# Patient Record
Sex: FEMALE | Race: WHITE | NOT HISPANIC OR LATINO | Employment: FULL TIME | URBAN - METROPOLITAN AREA
[De-identification: names, ages, dates, MRNs, and addresses within clinical notes are randomized per-mention and may not be internally consistent; named-entity substitution may affect disease eponyms.]

---

## 2022-10-17 ENCOUNTER — DOCUMENTATION (OUTPATIENT)
Dept: NEUROSURGERY | Facility: CLINIC | Age: 57
End: 2022-10-17

## 2022-10-17 ENCOUNTER — TELEPHONE (OUTPATIENT)
Dept: NEUROSURGERY | Facility: CLINIC | Age: 57
End: 2022-10-17

## 2022-10-17 NOTE — TELEPHONE ENCOUNTER
Called patient to remind her to bring he radiology disc to her appointment on 10/18. Patient does not have voicemail.

## 2022-10-17 NOTE — PROGRESS NOTES
Called patient to remind her to bring her Radiolog disc to her visit. Patients voicemail is not set up.

## 2022-10-18 ENCOUNTER — OFFICE VISIT (OUTPATIENT)
Dept: NEUROSURGERY | Facility: CLINIC | Age: 57
End: 2022-10-18

## 2022-10-18 VITALS
SYSTOLIC BLOOD PRESSURE: 150 MMHG | BODY MASS INDEX: 26.84 KG/M2 | DIASTOLIC BLOOD PRESSURE: 68 MMHG | WEIGHT: 171 LBS | HEART RATE: 89 BPM | HEIGHT: 67 IN

## 2022-10-18 DIAGNOSIS — S22.050A COMPRESSION FRACTURE OF T6 VERTEBRA, INITIAL ENCOUNTER: Primary | ICD-10-CM

## 2022-10-18 DIAGNOSIS — M47.814 THORACIC SPONDYLOSIS WITHOUT MYELOPATHY: ICD-10-CM

## 2022-10-18 DIAGNOSIS — M54.9 MID BACK PAIN: ICD-10-CM

## 2022-10-18 PROCEDURE — 99203 OFFICE O/P NEW LOW 30 MIN: CPT | Performed by: NURSE PRACTITIONER

## 2022-10-18 RX ORDER — HYDROCODONE BITARTRATE AND ACETAMINOPHEN 5; 325 MG/1; MG/1
1 TABLET ORAL EVERY 6 HOURS PRN
COMMUNITY
Start: 2022-10-06 | End: 2022-11-29

## 2022-10-18 RX ORDER — LISINOPRIL 10 MG/1
10 TABLET ORAL DAILY
COMMUNITY
Start: 2022-10-03

## 2022-10-18 RX ORDER — IBUPROFEN 800 MG/1
TABLET ORAL
COMMUNITY
Start: 2022-10-08

## 2022-10-18 RX ORDER — GRAPE SEED EXT/BIOFLAV,CITRUS 50MG-250MG
CAPSULE ORAL
COMMUNITY

## 2022-10-18 RX ORDER — ALENDRONATE SODIUM 10 MG/1
TABLET ORAL
COMMUNITY
Start: 2022-09-24

## 2022-10-18 RX ORDER — ACETAMINOPHEN 160 MG
2000 TABLET,DISINTEGRATING ORAL DAILY
COMMUNITY
Start: 2022-10-06

## 2022-10-18 RX ORDER — ESCITALOPRAM OXALATE 20 MG/1
20 TABLET ORAL DAILY
COMMUNITY
Start: 2022-09-24

## 2022-10-18 RX ORDER — PANTOPRAZOLE SODIUM 20 MG/1
20 TABLET, DELAYED RELEASE ORAL DAILY
COMMUNITY
Start: 2022-07-29

## 2022-10-18 RX ORDER — HYDROCHLOROTHIAZIDE 25 MG/1
25 TABLET ORAL EVERY MORNING
COMMUNITY
Start: 2022-09-23

## 2022-10-18 NOTE — PROGRESS NOTES
"Chief Complaint  Back Pain (Mid back to left side)    Subjective          Karlie VIZCAINO who is a 57 y.o. year old female who presents to [facility] for evaluation of mid back pain.    The patient complains of pain located in the thoracic  spine.  Patients states the pain has been present for 1 months.  The pain came on acutely.  She works as a  and . She reports taht she was doing some lifting, frying french fries, when she felt pain in her mid back. She woke up the next day and pain had worsened, continued to progress to the point she had to go home. The pain scale level is 3 at rest. Will increase to an 8/10 with activities.  The pain does radiate. Dermatomes are located on left Thoracic at: T6, T7 and T8..  The pain is waxing/waning and described as burning and sharp, aching.  The pain is worse at no particular time of day. Patient states bending, twisting, lifting and driving makes the pain worse.  Patient states rest and heating pad makes the pain better.    Associated Symptoms Include: Denies numbness and tingling  Conservative Interventions Include: Pain Medications that were somewhat effective. and NSAIDs that were not very effective.    Was this the result of an injury or accident?: No    History of Previous Spinal Surgery?: No    Nicotine use:  vapes    BMI: Body mass index is 26.78 kg/m².      Review of Systems   Musculoskeletal: Positive for arthralgias, back pain and myalgias.   Neurological: Positive for numbness.   All other systems reviewed and are negative.       Objective   Vital Signs:   /68   Pulse 89   Ht 170.2 cm (67\")   Wt 77.6 kg (171 lb)   BMI 26.78 kg/m²       Physical Exam  Vitals reviewed.   Constitutional:       Appearance: Normal appearance.   Musculoskeletal:      Thoracic back: Tenderness present.      Lumbar back: No tenderness. Negative right straight leg raise test and negative left straight leg " raise test.      Right hip: No tenderness. Normal range of motion.      Left hip: No tenderness. Normal range of motion.   Neurological:      Mental Status: She is alert and oriented to person, place, and time.      Motor: Motor strength is normal.      Gait: Gait is intact.      Deep Tendon Reflexes:      Reflex Scores:       Patellar reflexes are 0 on the right side and 0 on the left side.       Achilles reflexes are 0 on the right side and 0 on the left side.       Neurologic Exam     Mental Status   Oriented to person, place, and time.   Level of consciousness: alert    Motor Exam   Muscle bulk: normal  Overall muscle tone: normal    Strength   Strength 5/5 throughout.     Sensory Exam   Light touch normal.     Gait, Coordination, and Reflexes     Gait  Gait: normal    Reflexes   Right patellar: 0  Left patellar: 0  Right achilles: 0  Left achilles: 0  Right ankle clonus: absent  Left ankle clonus: absent       Result Review :       Data reviewed: Radiologic studies XR Throacic Spine demonstrates compression fracture at T6 likley chronic in nature. Multilple anterior wedging of midthoracic vertebral bodies invlove T5, T7, T8, and T9. Mid thoracic kyphosis.          Assessment and Plan    Diagnoses and all orders for this visit:    1. Compression fracture of T6 vertebra, initial encounter (HCC) (Primary)  -     MRI Thoracic Spine Without Contrast; Future    2. Thoracic spondylosis without myelopathy  -     MRI Thoracic Spine Without Contrast; Future    3. Mid back pain  -     MRI Thoracic Spine Without Contrast; Future    Pt presenting for evaluation of acute mid thoracic back pain, occurring in the context of heavy lifting. Recent XR demonstrates a T6 compression fracture, with multiple anterior wedging of the midthoracic vertebral bodies. Pt did not have traumatic injury and her fracture was felt to be chronic. Due to the acuity of her pain and radicular features, will proceed with MRI Thoracic Spine to stage  her fractures and evaluate for disc herniation. Will order TLSO brace to wear when out of bed. We discussed mobility restrictions of no lifting greater than 10 pounds, limit bending and twisting at the waist. She will follow up in 6 weeks.       Follow Up   Return in about 6 weeks (around 11/29/2022).  Patient was given instructions and counseling regarding her condition or for health maintenance advice.     -No lifting greater than 10 pounds, limit bending and twisting at the waist  -TLSO Brace, wear when out of bed, ok to take off at night and when showering  -MRI Thoracic Spine  -Follow up in 6 weeks

## 2022-10-18 NOTE — PATIENT INSTRUCTIONS
-No lifting greater than 10 pounds, limit bending and twisting at the waist  -TLSO Brace, wear when out of bed, ok to take off at night and when showering  -MRI Thoracic Spine  -Follow up in 6 weeks

## 2022-11-16 ENCOUNTER — TELEPHONE (OUTPATIENT)
Dept: NEUROSURGERY | Facility: CLINIC | Age: 57
End: 2022-11-16

## 2022-11-16 NOTE — TELEPHONE ENCOUNTER
MRI T Spine demonstrates a severe compression fracture at T7. Will review at follow up remind patient to bring her disc to review.    Patient informed, and reminded to bring disc

## 2022-11-29 ENCOUNTER — OFFICE VISIT (OUTPATIENT)
Dept: NEUROSURGERY | Facility: CLINIC | Age: 57
End: 2022-11-29

## 2022-11-29 VITALS
SYSTOLIC BLOOD PRESSURE: 141 MMHG | BODY MASS INDEX: 27.31 KG/M2 | DIASTOLIC BLOOD PRESSURE: 67 MMHG | HEART RATE: 67 BPM | WEIGHT: 174 LBS | HEIGHT: 67 IN

## 2022-11-29 DIAGNOSIS — M47.814 THORACIC SPONDYLOSIS WITHOUT MYELOPATHY: ICD-10-CM

## 2022-11-29 DIAGNOSIS — S22.060D COMPRESSION FRACTURE OF T7 VERTEBRA WITH ROUTINE HEALING, SUBSEQUENT ENCOUNTER: Primary | ICD-10-CM

## 2022-11-29 DIAGNOSIS — M54.9 MID BACK PAIN: ICD-10-CM

## 2022-11-29 PROCEDURE — 99214 OFFICE O/P EST MOD 30 MIN: CPT | Performed by: NURSE PRACTITIONER

## 2022-11-29 RX ORDER — AMOXICILLIN AND CLAVULANATE POTASSIUM 875; 125 MG/1; MG/1
TABLET, FILM COATED ORAL
COMMUNITY
Start: 2022-11-28

## 2022-11-29 NOTE — PROGRESS NOTES
Chief Complaint  Follow-up    Subjective          Karlie VIZCAINO who is a 57 y.o. year old female who presents to [facility] for follow up of mid back pain.     History of Present Illness  MRI Thoracic spine on 11/10/22 at [facility] personally reviewed. Severe wedge compression fracture of T7 with 90% loss of height, appearing subacute to chronic in nature. Mild multilevel degenerative changes without significant canal or foraminal stenosis.      Her back pain is improving. Rates her pain a 4/10 today. Bending and twisting will increase her pain. Pt has been wearing her TLSO brace, though unsure this provides her much benefit. She is no longer taking Norco for her pain. She is taking Ibuprofen as needed which provides her benefit.     She has been tolerating work with restrictions. She works as a  and .           Interval History 10/18/22     Karlie VIZCAINO who is a 57 y.o. year old female who presents to [facility] for evaluation of mid back pain.     The patient complains of pain located in the thoracic  spine.  Patients states the pain has been present for 1 months.  The pain came on acutely.  She works as a  and . She reports taht she was doing some lifting, frying french fries, when she felt pain in her mid back. She woke up the next day and pain had worsened, continued to progress to the point she had to go home. The pain scale level is 3 at rest. Will increase to an 8/10 with activities.  The pain does radiate. Dermatomes are located on left Thoracic at: T6, T7 and T8..  The pain is waxing/waning and described as burning and sharp, aching.  The pain is worse at no particular time of day. Patient states bending, twisting, lifting and driving makes the pain worse.  Patient states rest and heating pad makes the pain better.     Associated Symptoms Include:  "Denies numbness and tingling  Conservative Interventions Include: Pain Medications that were somewhat effective. and NSAIDs that were not very effective.     Was this the result of an injury or accident?: No     History of Previous Spinal Surgery?: No     Nicotine use:  vapes     BMI: Body mass index is 26.78 kg/m².    Recent Interventions: See above      Review of Systems   Musculoskeletal: Positive for arthralgias and back pain.   All other systems reviewed and are negative.       Objective   Vital Signs:   /67 (BP Location: Right arm, Patient Position: Sitting, Cuff Size: Adult)   Pulse 67   Ht 170.2 cm (67\")   Wt 78.9 kg (174 lb)   BMI 27.25 kg/m²       Physical Exam  Vitals reviewed.   Constitutional:       Appearance: Normal appearance.   Musculoskeletal:      Thoracic back: Tenderness present.   Neurological:      Mental Status: She is alert and oriented to person, place, and time.      Motor: Motor strength is normal.      Gait: Gait is intact.        Neurologic Exam     Mental Status   Oriented to person, place, and time.   Level of consciousness: alert    Motor Exam   Muscle bulk: normal  Overall muscle tone: normal    Strength   Strength 5/5 throughout.     Sensory Exam   Light touch normal.     Gait, Coordination, and Reflexes     Gait  Gait: normal       Result Review :       Data reviewed: Radiologic studies MRI Thoracic spine on 11/10/22 at [facility] personally reviewed. Severe wedge compression fracture of T7 with 90% loss of height, appearing subacute to chronic in nature. Mild multilevel degenerative changes without significant canal or foraminal stenosis.           Assessment and Plan    Diagnoses and all orders for this visit:    1. Compression fracture of T7 vertebra with routine healing, subsequent encounter (Primary)    2. Thoracic spondylosis without myelopathy    3. Mid back pain    We reviewed her MRI Thoracic Spine, demonstrating a subacute to chronic wedge compression " fracture at T7. Her pain has improved. Will begin weaning off TLSO brace. Can continue Ibuprofen as needed for pain. Will increase lifting restrictions to no lifting greater than 20 pounds. She will follow up in 4 weeks and if doing well will release without restrictions.       Follow Up   Return in about 4 weeks (around 12/27/2022).  Patient was given instructions and counseling regarding her condition or for health maintenance advice.     -Start weaning off TLSO brace  -No lifting greater than 20 pounds  -Follow up in 4 weeks

## 2023-01-04 ENCOUNTER — OFFICE VISIT (OUTPATIENT)
Dept: NEUROSURGERY | Facility: CLINIC | Age: 58
End: 2023-01-04
Payer: MEDICAID

## 2023-01-04 VITALS
HEIGHT: 67 IN | WEIGHT: 166.7 LBS | HEART RATE: 68 BPM | BODY MASS INDEX: 26.16 KG/M2 | SYSTOLIC BLOOD PRESSURE: 129 MMHG | DIASTOLIC BLOOD PRESSURE: 63 MMHG

## 2023-01-04 DIAGNOSIS — S22.060D COMPRESSION FRACTURE OF T7 VERTEBRA WITH ROUTINE HEALING, SUBSEQUENT ENCOUNTER: Primary | ICD-10-CM

## 2023-01-04 DIAGNOSIS — M47.814 THORACIC SPONDYLOSIS WITHOUT MYELOPATHY: ICD-10-CM

## 2023-01-04 PROCEDURE — 99213 OFFICE O/P EST LOW 20 MIN: CPT | Performed by: NURSE PRACTITIONER

## 2023-01-04 NOTE — PATIENT INSTRUCTIONS
-Release to work without restrictions  -Continue to slowly increase activity  -TLSO brace only as needed  -Follow up as needed

## 2023-01-04 NOTE — LETTER
January 4, 2023     Patient: Karlie VIZCAINO   YOB: 1965   Date of Visit: 1/4/2023       To Whom It May Concern:    It is my medical opinion that Karlie VIZCAINO may return to full duty immediately with no restrictions.           Sincerely,        HANG Leyva    CC: No Recipients

## 2023-01-04 NOTE — PROGRESS NOTES
"Chief Complaint  Follow-up    Subjective          Karlie VIZCAINO who is a 57 y.o. year old female who presents to [facility] for follow up of T7 compression fracture.     History of Present Illness  Pt's pain continues to improve. She has returned to work and is tolerating this ok. She will wear her brace while working which provides her benefit.       Interval History 11/29/22    Karlie VIZCAINO who is a 57 y.o. year old female who presents to [facility] for follow up of mid back pain.      History of Present Illness  MRI Thoracic spine on 11/10/22 at [facility] personally reviewed. Severe wedge compression fracture of T7 with 90% loss of height, appearing subacute to chronic in nature. Mild multilevel degenerative changes without significant canal or foraminal stenosis.       Her back pain is improving. Rates her pain a 4/10 today. Bending and twisting will increase her pain. Pt has been wearing her TLSO brace, though unsure this provides her much benefit. She is no longer taking Norco for her pain. She is taking Ibuprofen as needed which provides her benefit.      She has been tolerating work with restrictions. She works as a  and .      Recent Interventions: TLSO brace      Review of Systems     Objective   Vital Signs:   /63 (BP Location: Left arm, Patient Position: Sitting, Cuff Size: Adult)   Pulse 68   Ht 170.2 cm (67\")   Wt 75.6 kg (166 lb 11.2 oz)   BMI 26.11 kg/m²       Physical Exam  Vitals reviewed.   Constitutional:       Appearance: Normal appearance.   Musculoskeletal:      Lumbar back: No tenderness. Negative right straight leg raise test and negative left straight leg raise test.      Right hip: No tenderness. Normal range of motion.      Left hip: No tenderness. Normal range of motion.   Neurological:      Mental Status: She is alert and oriented to person, place, and " time.      Motor: Motor strength is normal.      Gait: Gait is intact.      Deep Tendon Reflexes:      Reflex Scores:       Patellar reflexes are 2+ on the right side and 2+ on the left side.       Achilles reflexes are 2+ on the right side and 2+ on the left side.       Neurologic Exam     Mental Status   Oriented to person, place, and time.   Level of consciousness: alert    Motor Exam   Muscle bulk: normal  Overall muscle tone: normal    Strength   Strength 5/5 throughout.     Sensory Exam   Light touch normal.     Gait, Coordination, and Reflexes     Gait  Gait: normal    Reflexes   Right patellar: 2+  Left patellar: 2+  Right achilles: 2+  Left achilles: 2+  Right ankle clonus: absent  Left ankle clonus: absent       Result Review :       Data reviewed: Radiologic studies MRI Thoracic spine on 11/10/22 at [facility] personally reviewed. Severe wedge compression fracture of T7 with 90% loss of height, appearing subacute to chronic in nature. Mild multilevel degenerative changes without significant canal or foraminal stenosis.            Assessment and Plan    Diagnoses and all orders for this visit:    1. Compression fracture of T7 vertebra with routine healing, subsequent encounter (Primary)    2. Thoracic spondylosis without myelopathy    Pt's back pain is improving. Will release to work without restrictions. We discussed slowly increasing activity as tolerated. Begin weaning off brace. She will follow up as needed.       Follow Up   Return if symptoms worsen or fail to improve.  Patient was given instructions and counseling regarding her condition or for health maintenance advice.     -Release to work without restrictions  -Continue to slowly increase activity  -TLSO brace only as needed  -Follow up as needed